# Patient Record
Sex: FEMALE
[De-identification: names, ages, dates, MRNs, and addresses within clinical notes are randomized per-mention and may not be internally consistent; named-entity substitution may affect disease eponyms.]

---

## 2024-01-08 ENCOUNTER — APPOINTMENT (OUTPATIENT)
Dept: OBGYN | Facility: CLINIC | Age: 29
End: 2024-01-08
Payer: COMMERCIAL

## 2024-01-08 VITALS
BODY MASS INDEX: 24.17 KG/M2 | DIASTOLIC BLOOD PRESSURE: 87 MMHG | HEIGHT: 61 IN | WEIGHT: 128 LBS | SYSTOLIC BLOOD PRESSURE: 136 MMHG | HEART RATE: 87 BPM | OXYGEN SATURATION: 99 %

## 2024-01-08 DIAGNOSIS — Z33.2 ENCOUNTER FOR ELECTIVE TERMINATION OF PREGNANCY: ICD-10-CM

## 2024-01-08 DIAGNOSIS — Z01.419 ENCOUNTER FOR GYNECOLOGICAL EXAMINATION (GENERAL) (ROUTINE) W/OUT ABNORMAL FINDINGS: ICD-10-CM

## 2024-01-08 PROBLEM — Z00.00 ENCOUNTER FOR PREVENTIVE HEALTH EXAMINATION: Status: ACTIVE | Noted: 2024-01-08

## 2024-01-08 PROCEDURE — 99385 PREV VISIT NEW AGE 18-39: CPT

## 2024-01-08 NOTE — HISTORY OF PRESENT ILLNESS
[FreeTextEntry1] : 28 year old  female presents to [facility] care for an annual. Currently sexually active with women.  She complains of pelvic pain during ovulation. She has a history of self-limited ovarian cysts  OBHx: surgical termination x1(2015) NKDA  SHx: Referred by sister, Emelyn Muller. Employed as a  in [employer].

## 2024-01-08 NOTE — END OF VISIT
[FreeTextEntry3] : I, Isabela David, acted as a scribe on behalf of Dr. Emelyn Harkins M.D. on 01/08/2024.  All medical entries made by the scribe were at my, Dr. Emelyn Harkins M.D., direction and personally dictated by me on 01/08/2024. I have reviewed the chart and agree that the record accurately reflects my personal performance of the history, physical exam, assessment and plan. I have also personally directed, reviewed, and agreed with the chart.

## 2024-01-08 NOTE — PLAN
[FreeTextEntry1] : 28 year old female presents for an annual  -PAP done  -Pt declined STI screening, since she had one about 3wks ago -Offered OCP for ovulation suppression, pt declines  RTO in 1 year

## 2024-01-17 LAB — CYTOLOGY CVX/VAG DOC THIN PREP: ABNORMAL

## 2024-02-26 ENCOUNTER — APPOINTMENT (OUTPATIENT)
Dept: OBGYN | Facility: CLINIC | Age: 29
End: 2024-02-26
Payer: COMMERCIAL

## 2024-02-26 VITALS
HEIGHT: 61 IN | HEART RATE: 74 BPM | BODY MASS INDEX: 23.98 KG/M2 | OXYGEN SATURATION: 100 % | SYSTOLIC BLOOD PRESSURE: 118 MMHG | WEIGHT: 127 LBS | DIASTOLIC BLOOD PRESSURE: 62 MMHG

## 2024-02-26 DIAGNOSIS — R87.612 LOW GRADE SQUAMOUS INTRAEPITHELIAL LESION ON CYTOLOGIC SMEAR OF CERVIX (LGSIL): ICD-10-CM

## 2024-02-26 PROCEDURE — 57454 BX/CURETT OF CERVIX W/SCOPE: CPT

## 2024-02-26 PROCEDURE — 36415 COLL VENOUS BLD VENIPUNCTURE: CPT

## 2024-02-26 PROCEDURE — 57456 ENDOCERV CURETTAGE W/SCOPE: CPT

## 2024-02-26 NOTE — END OF VISIT
[FreeTextEntry3] : I, Natalia Gillis, acted solely as a scribe for Dr. Emelyn Harkins, on 02/26/2024.   All medical record entries made by the scribe were at my, Dr. Emelyn Harkins., direction and personally dictated by me on 02/26/2024. I have personally reviewed the chart and agree that the record accurately reflects my personal performance of the history, physical exam, assessment and plan.

## 2024-02-26 NOTE — PROCEDURE
[Colposcopy] : Colposcopy  [Time out performed] : Pre-procedure time out performed.  Patient's name, date of birth and procedure confirmed. [Consent Obtained] : Consent obtained [Risks] : risks [Benefits] : benefits [Patient] : patient [Alternatives] : alternatives [Infection] : infection [Allergic Reaction] : allergic reaction [Bleeding] : bleeding [LGSIL] : LGSIL [Hemostasis Obtained] : Hemostasis obtained [Tolerated Well] : the patient tolerated the procedure well [ECC Performed] : ECC performed [No Premedication] : no premedication [SCI Fully Visualized] : SCI fully visualized [de-identified] : 12 & 6 o'clock, ECC [de-identified] : chronic cervicitis

## 2024-03-18 LAB — CORE LAB BIOPSY: NORMAL
